# Patient Record
Sex: MALE | Race: WHITE | NOT HISPANIC OR LATINO | Employment: FULL TIME | URBAN - METROPOLITAN AREA
[De-identification: names, ages, dates, MRNs, and addresses within clinical notes are randomized per-mention and may not be internally consistent; named-entity substitution may affect disease eponyms.]

---

## 2024-03-21 ENCOUNTER — OFFICE VISIT (OUTPATIENT)
Dept: UROLOGY | Facility: HOSPITAL | Age: 39
End: 2024-03-21
Payer: COMMERCIAL

## 2024-03-21 DIAGNOSIS — N20.0 RIGHT RENAL STONE: Primary | ICD-10-CM

## 2024-03-21 PROCEDURE — 99214 OFFICE O/P EST MOD 30 MIN: CPT | Performed by: STUDENT IN AN ORGANIZED HEALTH CARE EDUCATION/TRAINING PROGRAM

## 2024-03-21 PROCEDURE — 99204 OFFICE O/P NEW MOD 45 MIN: CPT | Performed by: STUDENT IN AN ORGANIZED HEALTH CARE EDUCATION/TRAINING PROGRAM

## 2024-03-21 NOTE — PROGRESS NOTES
Subjective   Patient ID: Lan Marie is a 38 y.o. male    HPI  38 y.o. male presents for follow-up as recommended by his primary care physician after experiencing his first known episode of kidney stones approximately three weeks ago. He reports severe pain in his lower right flank at that time. A CT scan performed at an outside facility ([facility]) showed two stones, both of which he confirms having visually passed. Since passing the stones, the patient has not experienced any further pain. He has a family history of kidney stones, with an uncle who frequently develops them. The patient is currently on hydrochlorothiazide and reports frequent urination, which he attributes to the medication. There are no other urinary symptoms suggestive of prostate or bladder issues.        Review of Systems    All systems were reviewed. Anything negative was noted in the HPI.    Objective   Physical Exam    General: Well developed, well nourished, alert and cooperative, appears in no acute distress   Eyes: Non-injected conjunctiva, sclera clear, no proptosis   Cardiac: Extremities are warm and well perfused. No edema, cyanosis or pallor   Lungs: Breathing is easy, non-labored. Speaking in clear and complete sentences. Normal diaphragmatic movement   MSK: Ambulatory with steady gait, unassisted   Neuro: Alert and oriented to person, place, and time   Psych: Demonstrates good judgment and reason, without hallucinations, abnormal affect or abnormal behaviors   Skin: No obvious lesions, no rashes       No CVA tenderness bilaterally   No suprapubic pain or discomfort       No past medical history on file.      No past surgical history on file.      Assessment/Plan   Nephrolithiasis    38 y.o. male who presents for the above condition, We had a very long and extensive discussion with the patient regarding the pathophysiology, differential diagnosis, risk factor, management, natural history, incidence and diagnostic work-up of the  condition.      Plan:  - Lifestyle modification  - Follow up in 6 months with Renal US          Scribe Attestation  By signing my name below, I, Wilmar Alicia   attest that this documentation has been prepared under the direction and in the presence of Dr. John Gonzalez

## 2024-09-25 ENCOUNTER — APPOINTMENT (OUTPATIENT)
Dept: RADIOLOGY | Facility: HOSPITAL | Age: 39
End: 2024-09-25
Payer: COMMERCIAL

## 2024-10-02 ENCOUNTER — APPOINTMENT (OUTPATIENT)
Dept: UROLOGY | Facility: HOSPITAL | Age: 39
End: 2024-10-02
Payer: COMMERCIAL